# Patient Record
Sex: FEMALE | Race: WHITE | NOT HISPANIC OR LATINO | URBAN - METROPOLITAN AREA
[De-identification: names, ages, dates, MRNs, and addresses within clinical notes are randomized per-mention and may not be internally consistent; named-entity substitution may affect disease eponyms.]

---

## 2020-09-15 ENCOUNTER — EMERGENCY (EMERGENCY)
Facility: HOSPITAL | Age: 35
LOS: 1 days | Discharge: HOME | End: 2020-09-15
Admitting: EMERGENCY MEDICINE
Payer: SELF-PAY

## 2020-09-15 VITALS
SYSTOLIC BLOOD PRESSURE: 94 MMHG | OXYGEN SATURATION: 100 % | TEMPERATURE: 99 F | RESPIRATION RATE: 16 BRPM | HEART RATE: 60 BPM | DIASTOLIC BLOOD PRESSURE: 61 MMHG

## 2020-09-15 VITALS
DIASTOLIC BLOOD PRESSURE: 61 MMHG | TEMPERATURE: 99 F | SYSTOLIC BLOOD PRESSURE: 104 MMHG | HEIGHT: 71 IN | HEART RATE: 67 BPM | RESPIRATION RATE: 18 BRPM | WEIGHT: 125.66 LBS | OXYGEN SATURATION: 100 %

## 2020-09-15 DIAGNOSIS — Z20.828 CONTACT WITH AND (SUSPECTED) EXPOSURE TO OTHER VIRAL COMMUNICABLE DISEASES: ICD-10-CM

## 2020-09-15 DIAGNOSIS — R50.9 FEVER, UNSPECIFIED: ICD-10-CM

## 2020-09-15 DIAGNOSIS — J02.0 STREPTOCOCCAL PHARYNGITIS: ICD-10-CM

## 2020-09-15 LAB
S PYO AG SPEC QL IA: NEGATIVE — SIGNIFICANT CHANGE UP
SARS-COV-2 RNA SPEC QL NAA+PROBE: SIGNIFICANT CHANGE UP

## 2020-09-15 PROCEDURE — 99053 MED SERV 10PM-8AM 24 HR FAC: CPT

## 2020-09-15 PROCEDURE — 99284 EMERGENCY DEPT VISIT MOD MDM: CPT

## 2020-09-15 RX ORDER — KETOROLAC TROMETHAMINE 30 MG/ML
30 SYRINGE (ML) INJECTION ONCE
Refills: 0 | Status: DISCONTINUED | OUTPATIENT
Start: 2020-09-15 | End: 2020-09-15

## 2020-09-15 RX ORDER — PENICILLIN G BENZATHINE 1200000 [IU]/2ML
1.2 INJECTION, SUSPENSION INTRAMUSCULAR ONCE
Refills: 0 | Status: COMPLETED | OUTPATIENT
Start: 2020-09-15 | End: 2020-09-15

## 2020-09-15 RX ORDER — IBUPROFEN 200 MG
1 TABLET ORAL
Qty: 20 | Refills: 0
Start: 2020-09-15 | End: 2020-10-14

## 2020-09-15 RX ORDER — SODIUM CHLORIDE 9 MG/ML
1000 INJECTION INTRAMUSCULAR; INTRAVENOUS; SUBCUTANEOUS ONCE
Refills: 0 | Status: COMPLETED | OUTPATIENT
Start: 2020-09-15 | End: 2020-09-15

## 2020-09-15 RX ORDER — AZITHROMYCIN 500 MG/1
1 TABLET, FILM COATED ORAL
Qty: 5 | Refills: 0
Start: 2020-09-15 | End: 2020-09-19

## 2020-09-15 RX ORDER — DEXAMETHASONE 0.5 MG/5ML
10 ELIXIR ORAL ONCE
Refills: 0 | Status: COMPLETED | OUTPATIENT
Start: 2020-09-15 | End: 2020-09-15

## 2020-09-15 RX ADMIN — Medication 30 MILLIGRAM(S): at 04:09

## 2020-09-15 RX ADMIN — Medication 10 MILLIGRAM(S): at 04:09

## 2020-09-15 RX ADMIN — SODIUM CHLORIDE 1000 MILLILITER(S): 9 INJECTION INTRAMUSCULAR; INTRAVENOUS; SUBCUTANEOUS at 04:09

## 2020-09-15 RX ADMIN — Medication 30 MILLIGRAM(S): at 04:24

## 2020-09-15 RX ADMIN — SODIUM CHLORIDE 1000 MILLILITER(S): 9 INJECTION INTRAMUSCULAR; INTRAVENOUS; SUBCUTANEOUS at 04:49

## 2020-09-15 NOTE — ED ADULT NURSE NOTE - OBJECTIVE STATEMENT
35 y/o F c/o fever and sore throat since yesterday morning. Pt lives with boyfriend who returned from [place] Saturday morning and has been having similar s/s. Pt denies CP and SOB. Pt denies N/V/D. Pt denies PMH.

## 2020-09-15 NOTE — ED PROVIDER NOTE - PHYSICAL EXAMINATION
Vital Signs - nursing notes reviewed and confirmed  Gen - WDWN F, NAD, comfortable and non-toxic appearing, speaking in full sentences, phonating well   Skin - warm, dry, intact  HEENT - AT/NC, PERRL, EOMI, no conjunctival injection, moist oral mucosa, TM intact b/l with good cone of lights, o/p clear with b/l tonsilar erythema and exudate, no edema, or fluctuance, uvula midline, airway patent, neck supple and NT, FROM, no JVD or carotid bruits b/l, +b/l palpable anterior cervical LN   CV - S1S2, R/R/R  Resp - respiration non-labored, CTAB, symmetric bs b/l, no r/r/w  GI - NABS, soft, ND, NT, no rebound or guarding, no CVAT b/l   MS - w/w/p, no c/c/e, calves supple and NT, distal pulses symmetric b/l, brisk cap refills, +SILT  Neuro - AxOx3, no focal neuro deficits, CN II-XII grossly intact, ambulatory without gait disturbance

## 2020-09-15 NOTE — ED PROVIDER NOTE - OBJECTIVE STATEMENT
35 yo F with PMHx of strep pharyngitis, presenting with her  c/o fever, body aches, throat pain and malaise x 2d.  Pt reports having sudden onset of body aches with associated malaise, arthralgia, sore throat, and fever (tmax 38.5) at home.  Noted sore throat yesterday afternoon. Took 1 tabs of amoxicillin left from before and nyquil PTA to the ED with improvement.  Reports alleviation of fever but still with sore throat.  Denies anosmia, ageusia, wheezing, hemoptysis, CP, orthopnea, palpitations, peripheral edema, stridors, congestion, focal weakness, tinnitus, HA, dizziness, N/V/D/C, abdominal pain, change in urinary/bowel function, night sweats, and malaise. No sick contact or contact with COVID + patients reported. Reports  with similar sx at home

## 2020-09-15 NOTE — ED ADULT NURSE NOTE - NSIMPLEMENTINTERV_GEN_ALL_ED
Implemented All Universal Safety Interventions:  [place] to call system. Call bell, personal items and telephone within reach. Instruct patient to call for assistance. Room bathroom lighting operational. Non-slip footwear when patient is off stretcher. Physically safe environment: no spills, clutter or unnecessary equipment. Stretcher in lowest position, wheels locked, appropriate side rails in place.

## 2020-09-15 NOTE — ED PROVIDER NOTE - CLINICAL SUMMARY MEDICAL DECISION MAKING FREE TEXT BOX
pt p/w 2d of fever, body aches, sore throat and myalgia, well appearing and phonating well in the ED, exam suggestive of strep pharyngitis, rapid strep positive, s/p IV toradol, decadron and bicillin IM with improvement, encouraged NSAID and salt water gargle, AFVSS at time of d/c, pt non-toxic appearing, results, ddx, and f/u plans discussed with pt at bedside, d/c'd home to f/u with PMD/ENT, strict return precautions discussed, prompt return to ER for any worsening or new sx, pt verbalized understanding. pt p/w 2d of fever, body aches, sore throat and myalgia, well appearing and phonating well in the ED, exam suggestive of strep pharyngitis, rapid strep negative but culture obtained and sent, treated for strep throat based on centor criteria in the ED, s/p IV toradol, decadron and bicillin IM with improvement, encouraged NSAID and salt water gargle, AFVSS at time of d/c, pt non-toxic appearing, results, ddx, and f/u plans discussed with pt at bedside, d/c'd home to f/u with PMD/ENT, strict return precautions discussed, prompt return to ER for any worsening or new sx, pt verbalized understanding.

## 2020-09-15 NOTE — ED PROVIDER NOTE - CARE PLAN
Principal Discharge DX:	Strep pharyngitis   Principal Discharge DX:	Strep pharyngitis  Secondary Diagnosis:	Febrile illness

## 2020-09-15 NOTE — ED PROVIDER NOTE - PATIENT PORTAL LINK FT
You can access the FollowMyHealth Patient Portal offered by [facility] by registering at the following website: [email or web address]. By joining [facility]’s FollowMyHealth portal, you will also be able to view your health information using other applications (apps) compatible with our system.

## 2020-09-15 NOTE — ED ADULT TRIAGE NOTE - CHIEF COMPLAINT QUOTE
Pt walks in with boyfriend c/o fever (TMax 101.3) and sore throat since yesterday morning. Pt lives with boyfriend who returned from [place] Saturday morning. Pt denies CP and SOB.

## 2020-09-15 NOTE — ED PROVIDER NOTE - CARE PROVIDER_API CALL
Rodrigo Carrera  Otolaryngology  [address], [address] [place] [address]  Phone: [phone]  Fax: [phone]  Follow Up Time:     your PMD,   Phone: (   )    -  Fax: (   )    -  Follow Up Time:

## 2020-09-15 NOTE — ED PROVIDER NOTE - NSFOLLOWUPINSTRUCTIONS_ED_ALL_ED_FT
Strep Throat    WHAT YOU NEED TO KNOW:    Strep throat is a throat infection caused by bacteria. It is easily spread from person to person.    DISCHARGE INSTRUCTIONS:    Call 911 for any of the following:   •You have trouble breathing.          Return to the emergency department if:   •You have new symptoms like a bad headache, stiff neck, chest pain, or vomiting.      •You are drooling because you cannot swallow your spit.      Contact your healthcare provider if:   •You have a fever.      •You have a rash or ear pain.      •You have green, yellow-brown, or bloody mucus when you cough or blow your nose.      •You are unable to drink anything.      •You have questions or concerns about your condition or care.      Medicines:   •Antibiotics help treat your strep throat. You should feel better within 2 to 3 days after you start antibiotics.      •Take your medicine as directed. Contact your healthcare provider if you think your medicine is not helping or if you have side effects. Tell him or her if you are allergic to any medicine. Keep a list of the medicines, vitamins, and herbs you take. Include the amounts, and when and why you take them. Bring the list or the pill bottles to follow-up visits. Carry your medicine list with you in case of an emergency.      Manage your symptoms:   •Use lozenges, ice, soft foods, or popsicles to soothe your throat.      •Drink juice, milk shakes, or soup if your throat is too sore to eat solid food. Drinking liquids can also help prevent dehydration.      •Gargle with salt water. Mix ¼ teaspoon salt in a glass of warm water and gargle. This may help reduce swelling in your throat.      •Do not smoke. Nicotine and other chemicals in cigarettes and cigars can cause lung damage and make your symptoms worse. Ask your healthcare provider for information if you currently smoke and need help to quit. E-cigarettes or smokeless tobacco still contain nicotine. Talk to your healthcare provider before you use these products.       Return to work or school 24 hours after you start antibiotic medicine.    Prevent the spread of strep throat:   •Wash your hands often. Use soap and water. Wash your hands after you use the bathroom, change a child's diapers, or sneeze. Wash your hands before you prepare or eat food.       •Do not share food or drinks. Replace your toothbrush after you have taken antibiotics for 24 hours.      Follow up with your healthcare provider as directed: Write down your questions so you remember to ask them during your visits. Fever    A fever is an increase in the body's temperature above 100.4°F (38°C) or higher. In adults and children older than three months, a brief mild or moderate fever generally has no long-term effect, and it usually does not require treatment. Many times, fevers are the result of viral infections, which are self-resolving.  However, certain symptoms or diagnostic tests may suggest a bacterial infection that may respond to antibiotics. Take medications as directed by your health care provider.    SEEK IMMEDIATE MEDICAL CARE IF YOU OR YOUR CHILD HAVE ANY OF THE FOLLOWING SYMPTOMS : shortness of breath, seizure, rash/stiff neck/headache, severe abdominal pain, persistent vomiting, any signs of dehydration, or if your child has a fever for over five (5) days.     Strep Throat    WHAT YOU NEED TO KNOW:    Strep throat is a throat infection caused by bacteria. It is easily spread from person to person.    DISCHARGE INSTRUCTIONS:    Call 911 for any of the following:   •You have trouble breathing.          Return to the emergency department if:   •You have new symptoms like a bad headache, stiff neck, chest pain, or vomiting.      •You are drooling because you cannot swallow your spit.      Contact your healthcare provider if:   •You have a fever.      •You have a rash or ear pain.      •You have green, yellow-brown, or bloody mucus when you cough or blow your nose.      •You are unable to drink anything.      •You have questions or concerns about your condition or care.      Medicines:   •Antibiotics help treat your strep throat. You should feel better within 2 to 3 days after you start antibiotics.      •Take your medicine as directed. Contact your healthcare provider if you think your medicine is not helping or if you have side effects. Tell him or her if you are allergic to any medicine. Keep a list of the medicines, vitamins, and herbs you take. Include the amounts, and when and why you take them. Bring the list or the pill bottles to follow-up visits. Carry your medicine list with you in case of an emergency.      Manage your symptoms:   •Use lozenges, ice, soft foods, or popsicles to soothe your throat.      •Drink juice, milk shakes, or soup if your throat is too sore to eat solid food. Drinking liquids can also help prevent dehydration.      •Gargle with salt water. Mix ¼ teaspoon salt in a glass of warm water and gargle. This may help reduce swelling in your throat.      •Do not smoke. Nicotine and other chemicals in cigarettes and cigars can cause lung damage and make your symptoms worse. Ask your healthcare provider for information if you currently smoke and need help to quit. E-cigarettes or smokeless tobacco still contain nicotine. Talk to your healthcare provider before you use these products.       Return to work or school 24 hours after you start antibiotic medicine.    Prevent the spread of strep throat:   •Wash your hands often. Use soap and water. Wash your hands after you use the bathroom, change a child's diapers, or sneeze. Wash your hands before you prepare or eat food.       •Do not share food or drinks. Replace your toothbrush after you have taken antibiotics for 24 hours.      Follow up with your healthcare provider as directed: Write down your questions so you remember to ask them during your visits.

## 2020-09-16 LAB
CULTURE RESULTS: SIGNIFICANT CHANGE UP
SPECIMEN SOURCE: SIGNIFICANT CHANGE UP

## 2023-05-12 NOTE — ED ADULT NURSE NOTE - PMH
Strep throat     Zoryve Counseling:  I discussed with the patient that Zoryve is not for use in the eyes, mouth or vagina. The most commonly reported side effects include diarrhea, headache, insomnia, application site pain, upper respiratory tract infections, and urinary tract infections.  All of the patient's questions and concerns were addressed.